# Patient Record
(demographics unavailable — no encounter records)

---

## 2024-10-09 NOTE — HISTORY OF PRESENT ILLNESS
[de-identified] : Patient presents today c/o left ear   tinnitus - ringing in ear .  Patient had tinnitus for years occasionally  but yesterday she states the ringing got really loud and her ear felt clogged.  She has muffled hearing since yesterday in her left ear

## 2024-10-09 NOTE — ASSESSMENT
[FreeTextEntry1] :  Comprehensive audiogram performed today, interpreted by me and reviewed with patient / family as appropriate: Hearing thresholds consistent with: normal save for 6khx mild snhl in left   Tympanograms: type A  Recommend burst of predisnone 20mg, 5 days.  Follow up in 1 month.     Total time spent on patient encounter including review of patient's medical history, physical examination, interpretation of any indicated labs / imaging and counseling, excluding time spent performing any indicated procedures: 30-44 minutes.    Gus Richardson MD, MPH Director of Pediatric Otolaryngology [facility] / [facility]

## 2024-10-09 NOTE — HISTORY OF PRESENT ILLNESS
[de-identified] : Patient presents today c/o left ear   tinnitus - ringing in ear .  Patient had tinnitus for years occasionally  but yesterday she states the ringing got really loud and her ear felt clogged.  She has muffled hearing since yesterday in her left ear

## 2024-10-09 NOTE — ASSESSMENT
[FreeTextEntry1] :  Comprehensive audiogram performed today, interpreted by me and reviewed with patient / family as appropriate: Hearing thresholds consistent with: normal save for 6khx mild snhl in left   Tympanograms: type A  Recommend burst of predisnone 20mg, 5 days.  Follow up in 1 month.     Total time spent on patient encounter including review of patient's medical history, physical examination, interpretation of any indicated labs / imaging and counseling, excluding time spent performing any indicated procedures: 30-44 minutes.    Gus Richardson MD, MPH Director of Pediatric Otolaryngology [facility] / [facility]

## 2024-11-07 NOTE — PHYSICAL EXAM
[Normocephalic] : normocephalic [Atraumatic] : atraumatic [EOMI] : extra ocular movement intact [Examined in the supine and seated position] : examined in the supine and seated position [Symmetrical] : symmetrical [No dominant masses] : no dominant masses in right breast  [No dominant masses] : no dominant masses left breast [No Nipple Retraction] : no left nipple retraction [No Nipple Discharge] : no left nipple discharge [No Axillary Lymphadenopathy] : no left axillary lymphadenopathy [No Edema] : no edema [No Rashes] : no rashes [No Ulceration] : no ulceration [de-identified] : On physical exam, there are no discrete masses in either breast or axilla. There is no nipple discharge or inversion bilaterally. There are no skin changes bilaterally.

## 2024-11-07 NOTE — DATA REVIEWED
[FreeTextEntry1] : CC: [id number]     EXAM:  MG US BREAST COMPLETE BI   ORDERED BY: LYDIA DURAND  PROCEDURE DATE:  10/30/2024    INTERPRETATION:  Clinical history: Screening ultrasound. The patient refuses mammography.  The patient reports her last clinical breast examination was performed within the past year.  Family history: None  Comparisons: Prior ultrasounds dating back to 2020.  Findings:  Ultrasound:  Bilateral whole breast ultrasound was performed.  Bilateral scattered benign cysts. No suspicious solid or cystic masses. No axillary adenopathy.  Impression: No sonographic evidence of malignancy.  Recommendation: Unless otherwise indicated by clinical findings, annual screening mammography recommended. The patient is currently due for her annual screening mammogram.  BI-RADS Category 2: Benign

## 2024-11-07 NOTE — HISTORY OF PRESENT ILLNESS
[FreeTextEntry1] : Zunilda is a 43F with fibrocystic breast changes.   She was previously a patient of Dr. Jovel, and was being followed q6 months because she had very cystic breasts.   At this time, she has not palpated any new breast lesions, although she does not do regular breast exams.  She denies any nipple discharge, but she does have cyclical breast pain with her menses.  She has never had any prior breast biopsies or surgeries.   HISTORICAL RISK FACTORS: -no prior breast biopsies/surgeries -no family history of breast or ovarian cancer  -no prior OCP use, , age at first live birth was 32  INTERVAL HISTORY:  Zunilda returns for a 1 year follow up visit.  She has no breast related complaints at this time.  She denies any breast pain, has not palpated any new palpable masses in either breast and denies any nipple discharge or retraction.    Her most recent imaging was performed on 10/7/2020, a b/l mammogram and US, which was unrevealing for any suspicious lesions in either breast but did note bilateral breast cysts, deemed BIRADS 2.     INTERVAL HISTORY:  10/19/21 Zunilda returns for a 1 year follow up visit.  She has no breast related complaints at this time.  She denies any breast pain, has not palpated any new palpable masses in either breast and denies any nipple discharge or retraction.     Her imaging as follows: 10/08/2021 b/l mammo and US -breasts are extremely dense -No suspicious mass, grouping of calcifications, or other abnormality is identified Deemed BIRADS1 RIGHT US: -benign cyst seen in the upper outer quadrant   INTERVAL HISTORY 22 Zunilda returns for a 1 year follow up visit.  She has no breast related complaints at this time.  She denies any breast pain, has not palpated any new palpable masses in either breast and denies any nipple discharge or retraction.     Her imaging as follows: 10/20/2022 B/L mammo and US -breasts are extremely dense -No mammographic evidence of malignancy -Simple appearing cysts are noted in both breasts the largest measuring 1.8 x 1.7 x 0.8 cm at 9-10 o'clock N8 of the right breast. BIRADS2  INTERVAL HISTORY 10/30/23 Zunilda returns for a 1 year follow up visit.  She has no breast related complaints at this time.  She states she has some cyclical breast pain but has not palpated any new palpable masses in either breast and denies any nipple discharge or retraction.     Her imaging as follows: 10/26/2023 b/l mammo and US-->birads1 -breasts are extremely dense -No mammographic evidence of malignancy. -Multiple bilateral simple appearing cysts are noted in both breasts.  INTERVAL DPPTDZX62/7/24 Zunilda returns for a 1 year follow up visit.  She has no breast related complaints at this time.  She states she has some cyclical breast pain but has not palpated any new palpable masses in either breast and denies any nipple discharge or retraction.     Her imaging as follows: 10/30/2024 B/L US --birads2 (patient refuses mammography) Bilateral scattered benign cysts The patient is currently due for her annual screening mammogram.

## 2024-11-07 NOTE — PAST MEDICAL HISTORY
[Menstruating] : The patient is menstruating [Menarche Age ____] : age at menarche was [unfilled] [Definite ___ (Date)] : the last menstrual period was [unfilled] [Total Preg ___] : G[unfilled] [Live Births ___] : P[unfilled]  [Age At Live Birth ___] : Age at live birth: [unfilled] [History of Hormone Replacement Treatment] : has no history of hormone replacement treatment [FreeTextEntry5] :   [FreeTextEntry6] : IVF x 1 cycle [FreeTextEntry7] : denies [FreeTextEntry8] : denies

## 2024-11-07 NOTE — ASSESSMENT
[FreeTextEntry1] : Zunilda is a 48F who presents with a history of fibrocystic breast disease and dense breasts.  Her imaging as follows: 10/30/2024 B/L US --birads2 (patient refuses mammography) Bilateral scattered benign cysts The patient is currently due for her annual screening mammogram.  She will be due for a b/l screening mammogram npw  This will be scheduled for her today.  In regards to her breast pain, it may be related to fibrocystic changes within her breast that are hormonally influenced. We spoke about possible interventions including evening primrose oil, supportive bras, and decreasing caffeine intake.  Although none of these have been consistently proven to improve breast pain, they may be tried.  If the pain becomes very severe, there have been studies of tamoxifen being effective for the treatment of breast pain, although there are risks with tamoxifen.  At this time she will try supportive measures.  We discussed dense breasts. Increasing breast density has been found to increase one risk of breast cancer, but at this time, there is no clear indication for additional imaging in this setting, as both US and MRI have not been found to improve survival. One can consider bilateral screening US in this setting. However, out of 1000 women screened, the use of routine US will only identify an additional 3-4 cancers. The use of US was found to increase the likelihood of undergoing more imaging and more biopsies. She does have dense breasts. We have decided to proceed with screening bilateral breast US at this time.  We discussed breast cysts. They are not pre-malignant nor do they have malignant potential and are hormonally influenced. They may grow or shrink in size as well as resolve spontaneously and there is usually no intervention unless they are symptomatic. In several large studies, patients with breast cysts and a positive family history had a higher relative risk of breast cancer (from 1.5 to 3). She is asymptomatic from her breast cysts so no intervention will be performed at this time.    -she is otherwise at an average risk for breast cancer and should continue with annual screening.  All of her questions were answered. She knows to call with any further questions or concerns.  PLAN: - b/l mammo now (we will call her with results) -if above is unrevealing she will be due for bl mammo and US 11/2025 -f/u after.

## 2024-11-07 NOTE — HISTORY OF PRESENT ILLNESS
[FreeTextEntry1] : Zunilda is a 43F with fibrocystic breast changes.   She was previously a patient of Dr. Jovel, and was being followed q6 months because she had very cystic breasts.   At this time, she has not palpated any new breast lesions, although she does not do regular breast exams.  She denies any nipple discharge, but she does have cyclical breast pain with her menses.  She has never had any prior breast biopsies or surgeries.   HISTORICAL RISK FACTORS: -no prior breast biopsies/surgeries -no family history of breast or ovarian cancer  -no prior OCP use, , age at first live birth was 32  INTERVAL HISTORY:  Zunilda returns for a 1 year follow up visit.  She has no breast related complaints at this time.  She denies any breast pain, has not palpated any new palpable masses in either breast and denies any nipple discharge or retraction.    Her most recent imaging was performed on 10/7/2020, a b/l mammogram and US, which was unrevealing for any suspicious lesions in either breast but did note bilateral breast cysts, deemed BIRADS 2.     INTERVAL HISTORY:  10/19/21 Zunilda returns for a 1 year follow up visit.  She has no breast related complaints at this time.  She denies any breast pain, has not palpated any new palpable masses in either breast and denies any nipple discharge or retraction.     Her imaging as follows: 10/08/2021 b/l mammo and US -breasts are extremely dense -No suspicious mass, grouping of calcifications, or other abnormality is identified Deemed BIRADS1 RIGHT US: -benign cyst seen in the upper outer quadrant   INTERVAL HISTORY 22 Zunilda returns for a 1 year follow up visit.  She has no breast related complaints at this time.  She denies any breast pain, has not palpated any new palpable masses in either breast and denies any nipple discharge or retraction.     Her imaging as follows: 10/20/2022 B/L mammo and US -breasts are extremely dense -No mammographic evidence of malignancy -Simple appearing cysts are noted in both breasts the largest measuring 1.8 x 1.7 x 0.8 cm at 9-10 o'clock N8 of the right breast. BIRADS2  INTERVAL HISTORY 10/30/23 Zunilda returns for a 1 year follow up visit.  She has no breast related complaints at this time.  She states she has some cyclical breast pain but has not palpated any new palpable masses in either breast and denies any nipple discharge or retraction.     Her imaging as follows: 10/26/2023 b/l mammo and US-->birads1 -breasts are extremely dense -No mammographic evidence of malignancy. -Multiple bilateral simple appearing cysts are noted in both breasts.  INTERVAL [id number]/7/24 Zunilda returns for a 1 year follow up visit.  She has no breast related complaints at this time.  She states she has some cyclical breast pain but has not palpated any new palpable masses in either breast and denies any nipple discharge or retraction.     Her imaging as follows: 10/30/2024 B/L US --birads2 (patient refuses mammography) Bilateral scattered benign cysts The patient is currently due for her annual screening mammogram.

## 2024-11-07 NOTE — DATA REVIEWED
[FreeTextEntry1] : CC: [id number]     EXAM:  MG US BREAST COMPLETE BI   ORDERED BY: LYDIA DURAND  PROCEDURE DATE:  10/30/2024    INTERPRETATION:  Clinical history: Screening ultrasound. The patient refuses mammography.  The patient reports her last clinical breast examination was performed within the past year.  Family history: None  Comparisons: Prior ultrasounds dating back to 2020.  Findings:  Ultrasound:  Bilateral whole breast ultrasound was performed.  Bilateral scattered benign cysts. No suspicious solid or cystic masses. No axillary adenopathy.  Impression: No sonographic evidence of malignancy.  Recommendation: Unless otherwise indicated by clinical findings, annual screening mammography recommended. The patient is currently due for her annual screening mammogram.  BI-RADS Category 2: Benign

## 2024-11-07 NOTE — PHYSICAL EXAM
[Normocephalic] : normocephalic [Atraumatic] : atraumatic [EOMI] : extra ocular movement intact [Examined in the supine and seated position] : examined in the supine and seated position [Symmetrical] : symmetrical [No dominant masses] : no dominant masses in right breast  [No dominant masses] : no dominant masses left breast [No Nipple Retraction] : no left nipple retraction [No Nipple Discharge] : no left nipple discharge [No Axillary Lymphadenopathy] : no left axillary lymphadenopathy [No Edema] : no edema [No Rashes] : no rashes [No Ulceration] : no ulceration [de-identified] : On physical exam, there are no discrete masses in either breast or axilla. There is no nipple discharge or inversion bilaterally. There are no skin changes bilaterally.

## 2024-11-17 NOTE — ASSESSMENT
[FreeTextEntry1] :  Repeat Comprehensive audiogram performed today, interpreted by me and reviewed with patient / family as appropriate: Hearing thresholds consistent with: normal save for 6khx mild snhl in left   Tympanograms: type A.  No improvement with steroid therapy.  Given duration of left tinnitus, recommend MRI IAC with contrast.  BMP ordered.   Follow up 1-2 months     Total time spent on patient encounter including review of patient's medical history, physical examination, interpretation of any indicated labs / imaging and counseling, excluding time spent performing any indicated procedures: 20-29 minutes   Gus Richardson MD, MPH Director of Pediatric Otolaryngology [facility] / [facility]

## 2024-11-17 NOTE — REASON FOR VISIT
[Subsequent Evaluation] : a subsequent evaluation for [FreeTextEntry2] :  left ear tinnitus - ringing in ear

## 2024-11-17 NOTE — HISTORY OF PRESENT ILLNESS
[FreeTextEntry1] :  Patient presents today c/o left ear tinnitus - ringing in ear. She states her symptoms are unchanged and she is still getting ringing in the ears. She feels clogged and as if her left ear is worse.

## 2024-11-17 NOTE — ASSESSMENT
[FreeTextEntry1] :  Repeat Comprehensive audiogram performed today, interpreted by me and reviewed with patient / family as appropriate: Hearing thresholds consistent with: normal save for 6khx mild snhl in left   Tympanograms: type A.  No improvement with steroid therapy.  Given duration of left tinnitus, recommend MRI IAC with contrast.  BMP ordered.   Follow up 1-2 months     Total time spent on patient encounter including review of patient's medical history, physical examination, interpretation of any indicated labs / imaging and counseling, excluding time spent performing any indicated procedures: 20-29 minutes   Gus Richardson MD, MPH Director of Pediatric Otolaryngology [facility] / [facility]

## 2024-11-17 NOTE — ASSESSMENT
[FreeTextEntry1] :  Repeat Comprehensive audiogram performed today, interpreted by me and reviewed with patient / family as appropriate: Hearing thresholds consistent with: normal save for 6khx mild snhl in left   Tympanograms: type A.  No improvement with steroid therapy.  Given duration of left tinnitus, recommend MRI IAC with contrast.  BMP ordered.   Follow up 1-2 months     Total time spent on patient encounter including review of patient's medical history, physical examination, interpretation of any indicated labs / imaging and counseling, excluding time spent performing any indicated procedures: 20-29 minutes   Gus Richardson MD, MPH Director of Pediatric Otolaryngology [facility] / [facility]

## 2024-11-18 NOTE — PROCEDURE
[Post-Op Patency] : post-op patency [Anterior rhinoscopy insufficient to account for symptoms] : anterior rhinoscopy insufficient to account for symptoms [Topical Lidocaine] : topical lidocaine [Oxymetazoline HCl] : oxymetazoline HCl [Flexible Endoscope] : examined with the flexible endoscope [Congested] : congested [Nayana] : nayana [Deviated to the Rt] : deviated to the right [Normal] : the middle meatus had no abnormalities

## 2024-11-18 NOTE — ASSESSMENT
[FreeTextEntry1] : Modified [place] maneuver: Positive +++  Patient to try breathe right strips.  Possible need for nasal valve remodeling.

## 2024-11-18 NOTE — HISTORY OF PRESENT ILLNESS
[FreeTextEntry1] : Patient is following up today for c/o deviated septum and possibly nasal bridge collapse  She states she had her nose done years ago and since then hasn't been able to breathe well since. Had procedure 20 years ago.  She feels restricted in both sides of her nose shortly after surgery. Has noticed over times had a divot on the bridge of her nose. Don't know if that was from wearing her glasses. No history of nose bleeds.  No further complaints Ears continue to feel clogged. Worse on the right. Plan on getting MRI waiting for MRI.

## 2024-11-18 NOTE — REASON FOR VISIT
[Subsequent Evaluation] : a subsequent evaluation for [FreeTextEntry2] : deviated septum and possibly nasal bridge collapse

## 2025-05-25 NOTE — REASON FOR VISIT
[Subsequent Evaluation] : a subsequent evaluation for [FreeTextEntry2] : deviated septum and possibly nasal bridge collapse.

## 2025-05-25 NOTE — ASSESSMENT
[FreeTextEntry1] : Left ear tinnitus and hearing loss.  No improvement with steroid therapy.  Given duration of left tinnitus, recommend MRI IAC with contrast.  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  Refer to Dr. Diaz for imaging review and further management recommendations.   Follow up 3 months     Total time spent on patient encounter including review of patient's medical history, physical examination, interpretation of any indicated labs / imaging and counseling, excluding time spent performing any indicated procedures: 38 minutes   Gus Richardson MD, MPH Director of Pediatric Otolaryngology [facility] / [facility]

## 2025-05-25 NOTE — HISTORY OF PRESENT ILLNESS
[FreeTextEntry1] : Patient returns today for a follow up on subsequent evaluation for left ear fullness and tinnitus.  Received MRI IAC - has brought imaging. States that she still feels the same states that she is here for the ear, PT states that the ear feels clogged and no pain.

## 2025-05-25 NOTE — ASSESSMENT
[FreeTextEntry1] : Left ear tinnitus and hearing loss.  No improvement with steroid therapy.  Given duration of left tinnitus, recommend MRI IAC with contrast.  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  Refer to Dr. Diaz for imaging review and further management recommendations.   Follow up 3 months     Total time spent on patient encounter including review of patient's medical history, physical examination, interpretation of any indicated labs / imaging and counseling, excluding time spent performing any indicated procedures: 38 minutes   Gus Richardson MD, MPH Director of Pediatric Otolaryngology [facility] / [facility]

## 2025-05-27 NOTE — ASSESSMENT
[FreeTextEntry1] : Incidental right petrous apex lesion identified on recent MRI while investigating asymmetric hearing loss.  It appears unlikely that her current symptoms are related to the right petrous apex lesion.  Investigation, however, should be done to rule out an expansile lesion.  I have discussed this with her and referred her for CT scan evaluation.  Mild positional vertigo reported.  I have provided her with a home vestibular exercise program.  Formal physical therapy is recommended if symptoms progress.  Tinnitus associated with limited high-frequency hearing loss in the left ear.  Coping strategies for tinnitus discussed.  Clinical monitoring recommended.  Noise protection recommended.

## 2025-05-27 NOTE — REASON FOR VISIT
[Subsequent Evaluation] : a subsequent evaluation for [FreeTextEntry2] : left ear fullness, tinnitus, hearing loss.

## 2025-05-27 NOTE — HISTORY OF PRESENT ILLNESS
[de-identified] : Referred by ENT colleague for subspecialty evaluation. MIR VENEGAS has a history of left ear fullness, tinnitus, hearing loss. Reports that she has constant clogging sensation in left ear. Complaining of tinnitus.  [FreeTextEntry1] : 05/27/2025  Pt returns today following up on tinnitus, hearing loss. Referred by Dr. Richardson. MRI 5/2/25. Tactile sensation in both ears in the presence of loud noises. Clogged sensation in the left ear also reported constant for a few months. Ill defined dizziness reported a few times per week, few second duration mostly with position changes.

## 2025-07-21 NOTE — IMAGING
[de-identified] :  healed incision shoulders pelvis level mild scoliosis some limits in motion all planes tested some spasm in the back negative straight leg negative bowstring bilaterally no focal neurologic deficits

## 2025-07-21 NOTE — REASON FOR VISIT
[FreeTextEntry2] : Patient is here today for lumbar spine pain weight stable at 120 pounds   stiff  especially in AM using diclofenac

## 2025-07-21 NOTE — ASSESSMENT
[FreeTextEntry1] :  refilled diclofenac continue weight management light exercises on her own with heat no x-rays necessary she remains totally disabled with see her back in a year Never smoker

## 2025-07-21 NOTE — HISTORY OF PRESENT ILLNESS
[de-identified] : History of Present Illness\par  46 year-old woman out on disability here for back pain recently was lifting something light and fell some significant\par  pain is was a couple weeks ago the pain has moderated denies any radiating symptoms\par  history scoliosis surgery 26 years ago in [place] by \par  takes diclofenac or Motrin as needed negative cough or sneeze effect\par  \par  Injury Details: back. On a scale of 0-10, the pain when active is 9. On a scale of 0-10, the pain when at rest is 6. The\par  patient was treated for this problem before, on n/a by Dr. Jonathan Amezquita. The patient had surgery for this problem on 26\par  years ago. The patient was disabled and unable to work from 2013 to present.

## 2025-07-25 NOTE — DISCUSSION/SUMMARY
[FreeTextEntry1] : 49 YEAR OLD FEMALE LMP 7/1/2025 WITH CYCLES MONTHLY X 3, HEAVY FOR 1ST DAY , PRESENTS FOR WELL WOMAN GYNECOLOGIC EXAMINATION AND PAP. LAST SEEN FOR WELL WOMAN VISIT 4/22/2024; PAP SMEAR DONE AT THAT TIME WAS NEGATIVE. LAST HPV HR TESTING WAS DONE 2023. NO NEW MEDICAL OR SRUGICAL HISTORY SINCE LAST VISIT. MEDICATIONS; NONE MEDICATION ALLERGIES;NONE ROS- BMS NORMAL; MOTHER WITH COLON CANCER AT AGE 75; LAST COLONOSCOPY DONE                     APPROXIMATELY 6 YRS AGO AND A ? POLYP WAS FOUND.          MIXED URINARY INCONTINENCE- SAW UROGYN AND SURGERY WAS ADVISED HOWEVER                     PT DOES NOT WANT SURGERY          NOT SEXUALLY ACTIVE BREASTS; NOT DOING BREAST SELF EXAMINATION                    LAST BREAST IMAGING, BILATERNAL BR. US DONE 10/30/2024 [facility] II DOES NOT EXERCISE; +MULTIVITAMINS; + DAIRY/CHEESE; NO TOBACCO OR VAPING FRACTURE HISTORY; NONE  PE;/68;TEMP 98.1; WEIGHT 130 LBS HEIGHT 5'5"       BREASTS; NO MASSES OR DISCHARGES       ABDOMEN;SOFT, NO MASSES ; NO TENDERNESS TO PALPATION; LOW TANS. SCAR       PELVIC NORMAL EXTERNAL GENTIAILA                     NORMAL URETHRAL MEATUS                     NORMAL LABIA MAJORA AND LABIA MINORA -NO LESIONS                     NORMAL VAGINA WITHOUT LESION                     NORMAL CERVIX                      3RD DEGREE RETROVERTED UTERUS ON BIMANUAL EXAMINATION                       NO PALPABLE ADNEXAL MASSES  IMP; WELL WOMAN          MIXED URINARY INCONTNENCE   PLAN; THIN PREP PAP WITH HR HPV TESTING DONE-PT TO CALL IN3  WKS FOR RESULTS            MONTHLY BREAST SELF EXAMINATION CONTINUED TO BE STRONGLY ADVISED            FOLLOWU MAMMOGRAPHY AND BREAST US FOR 10/2025 - SCIRPT GIVEN            COLONOSCOPY ADIVSED             RETURN TO OFFICE ONE YEAR OR PRN